# Patient Record
Sex: FEMALE | Race: WHITE | NOT HISPANIC OR LATINO | URBAN - METROPOLITAN AREA
[De-identification: names, ages, dates, MRNs, and addresses within clinical notes are randomized per-mention and may not be internally consistent; named-entity substitution may affect disease eponyms.]

---

## 2023-12-28 ENCOUNTER — OFFICE VISIT (OUTPATIENT)
Dept: URGENT CARE | Facility: CLINIC | Age: 53
End: 2023-12-28
Payer: COMMERCIAL

## 2023-12-28 VITALS
SYSTOLIC BLOOD PRESSURE: 111 MMHG | HEIGHT: 61 IN | HEART RATE: 73 BPM | RESPIRATION RATE: 18 BRPM | BODY MASS INDEX: 21.2 KG/M2 | OXYGEN SATURATION: 99 % | TEMPERATURE: 97 F | DIASTOLIC BLOOD PRESSURE: 71 MMHG | WEIGHT: 112.31 LBS

## 2023-12-28 DIAGNOSIS — J32.0 LEFT MAXILLARY SINUSITIS: Primary | ICD-10-CM

## 2023-12-28 PROCEDURE — 99204 PR OFFICE/OUTPT VISIT, NEW, LEVL IV, 45-59 MIN: ICD-10-PCS | Mod: S$GLB,,, | Performed by: NURSE PRACTITIONER

## 2023-12-28 PROCEDURE — 99204 OFFICE O/P NEW MOD 45 MIN: CPT | Mod: S$GLB,,, | Performed by: NURSE PRACTITIONER

## 2023-12-28 RX ORDER — CEFDINIR 300 MG/1
300 CAPSULE ORAL 2 TIMES DAILY
Qty: 14 CAPSULE | Refills: 0 | Status: SHIPPED | OUTPATIENT
Start: 2023-12-28 | End: 2024-01-04

## 2023-12-28 RX ORDER — PROMETHAZINE HYDROCHLORIDE AND DEXTROMETHORPHAN HYDROBROMIDE 6.25; 15 MG/5ML; MG/5ML
5 SYRUP ORAL EVERY 6 HOURS PRN
Qty: 180 ML | Refills: 0 | Status: SHIPPED | OUTPATIENT
Start: 2023-12-28

## 2023-12-28 RX ORDER — PREDNISONE 20 MG/1
40 TABLET ORAL DAILY
Qty: 10 TABLET | Refills: 0 | Status: SHIPPED | OUTPATIENT
Start: 2023-12-28

## 2023-12-28 RX ORDER — AZELASTINE 1 MG/ML
1 SPRAY, METERED NASAL 2 TIMES DAILY
Qty: 30 ML | Refills: 2 | Status: SHIPPED | OUTPATIENT
Start: 2023-12-28

## 2023-12-28 NOTE — PROGRESS NOTES
"Subjective:      Patient ID: Lindsey Jo is a 53 y.o. female.    Vitals:  height is 5' 1.02" (1.55 m) and weight is 51 kg (112 lb 5.2 oz). Her tympanic temperature is 97 °F (36.1 °C). Her blood pressure is 111/71 and her pulse is 73. Her respiration is 18 and oxygen saturation is 99%.     Chief Complaint: Sinus Problem    Patient presents with sore throat and left ear pain x 2 weeks. She states that she had sinus symptoms with no fever. Left side symptoms worse than Right.  She did not test for covid or flu.  Advil Cold and Sinus was taken with moderate relief.  Hx of sinus polyp removal     Sinus Problem  The current episode started 1 to 4 weeks ago (2). The problem has been gradually worsening since onset. There has been no fever. Associated symptoms include ear pain, headaches and a sore throat. Pertinent negatives include no congestion, coughing or sneezing. Treatments tried: Chloraseptic.       HENT:  Positive for ear pain and sore throat. Negative for congestion.    Respiratory:  Negative for cough.    Allergic/Immunologic: Negative for sneezing.   Neurological:  Positive for headaches.      Objective:     Vitals:    12/28/23 0927   BP: 111/71   BP Location: Right arm   Patient Position: Sitting   BP Method: Medium (Automatic)   Pulse: 73   Resp: 18   Temp: 97 °F (36.1 °C)   TempSrc: Tympanic   SpO2: 99%   Weight: 51 kg (112 lb 5.2 oz)   Height: 5' 1.02" (1.55 m)       Physical Exam   Constitutional: She is oriented to person, place, and time. She appears well-developed. She is cooperative.  Non-toxic appearance. She does not appear ill. No distress.   HENT:   Head: Normocephalic and atraumatic.   Ears:   Right Ear: Hearing, external ear and ear canal normal. A middle ear effusion is present.   Left Ear: Hearing, external ear and ear canal normal. Tympanic membrane is injected and retracted. A middle ear effusion is present.   Nose: Mucosal edema and congestion present. No rhinorrhea or nasal " deformity. No epistaxis. Right sinus exhibits frontal sinus tenderness. Right sinus exhibits no maxillary sinus tenderness. Left sinus exhibits frontal sinus tenderness. Left sinus exhibits no maxillary sinus tenderness.   Mouth/Throat: Uvula is midline and mucous membranes are normal. Mucous membranes are moist. No trismus in the jaw. Normal dentition. No uvula swelling. Posterior oropharyngeal erythema present. No oropharyngeal exudate or posterior oropharyngeal edema.   Eyes: Conjunctivae and lids are normal. Pupils are equal, round, and reactive to light. No scleral icterus. Extraocular movement intact   Neck: Trachea normal and phonation normal. Neck supple. No edema present. No erythema present. No neck rigidity present.   Cardiovascular: Normal rate, regular rhythm, normal heart sounds and normal pulses.   Pulmonary/Chest: Effort normal and breath sounds normal. No respiratory distress. She has no decreased breath sounds. She has no rhonchi.   Abdominal: Normal appearance.   Musculoskeletal: Normal range of motion.         General: No deformity. Normal range of motion.   Lymphadenopathy:     She has cervical adenopathy.   Neurological: She is alert and oriented to person, place, and time. She exhibits normal muscle tone. Coordination normal.   Skin: Skin is warm, dry, intact, not diaphoretic and not pale.   Psychiatric: Her speech is normal and behavior is normal. Judgment and thought content normal.   Nursing note and vitals reviewed.      Assessment:     1. Left maxillary sinusitis        Plan:   Patient stable for discharge and home management of condition      Left maxillary sinusitis  -     predniSONE (DELTASONE) 20 MG tablet; Take 2 tablets (40 mg total) by mouth once daily. Take with food  Dispense: 10 tablet; Refill: 0  -     cefdinir (OMNICEF) 300 MG capsule; Take 1 capsule (300 mg total) by mouth 2 (two) times daily. Take with food for 7 days  Dispense: 14 capsule; Refill: 0  -     azelastine  (ASTELIN) 137 mcg (0.1 %) nasal spray; 1 spray (137 mcg total) by Nasal route 2 (two) times daily.  Dispense: 30 mL; Refill: 2  -     promethazine-dextromethorphan (PROMETHAZINE-DM) 6.25-15 mg/5 mL Syrp; Take 5 mLs by mouth every 6 (six) hours as needed (cough/congestion).  Dispense: 180 mL; Refill: 0      Patient Instructions   Increase fluids   Rest activity ad alena   Tylenol 650 mg every 4-6 hrs as needed for fever headache body aches   Advil 200 mg 2-3 tabs every 6 hrs as needed for fever, headache body aches---Must take with food   Complete antibiotic as prescribed   Astelin  nasal spray  twice a day for allergic rhinitis    Supportive care measures   If symptoms persist or worsen follow up [facility] or PCP

## 2023-12-28 NOTE — PATIENT INSTRUCTIONS
Increase fluids   Rest activity ad alena   Tylenol 650 mg every 4-6 hrs as needed for fever headache body aches   Advil 200 mg 2-3 tabs every 6 hrs as needed for fever, headache body aches---Must take with food   Complete antibiotic as prescribed   Astelin  nasal spray  twice a day for allergic rhinitis    Supportive care measures   If symptoms persist or worsen follow up [facility] or PCP

## 2024-08-28 ENCOUNTER — OFFICE VISIT (OUTPATIENT)
Dept: URGENT CARE | Facility: CLINIC | Age: 54
End: 2024-08-28
Payer: COMMERCIAL

## 2024-08-28 VITALS
HEART RATE: 72 BPM | SYSTOLIC BLOOD PRESSURE: 86 MMHG | RESPIRATION RATE: 18 BRPM | DIASTOLIC BLOOD PRESSURE: 53 MMHG | TEMPERATURE: 99 F | HEIGHT: 60 IN | OXYGEN SATURATION: 98 % | BODY MASS INDEX: 22.48 KG/M2 | WEIGHT: 114.5 LBS

## 2024-08-28 DIAGNOSIS — M54.50 LOW BACK PAIN WITHOUT SCIATICA, UNSPECIFIED BACK PAIN LATERALITY, UNSPECIFIED CHRONICITY: Primary | ICD-10-CM

## 2024-08-28 PROCEDURE — 99214 OFFICE O/P EST MOD 30 MIN: CPT | Mod: S$GLB,,, | Performed by: FAMILY MEDICINE

## 2024-08-28 RX ORDER — METFORMIN HYDROCHLORIDE 500 MG/1
TABLET ORAL
COMMUNITY

## 2024-08-28 RX ORDER — PROGESTERONE 200 MG/1
200 CAPSULE ORAL NIGHTLY
COMMUNITY
Start: 2024-08-26

## 2024-08-28 RX ORDER — METHOCARBAMOL 500 MG/1
TABLET, FILM COATED ORAL
Qty: 30 TABLET | Refills: 0 | Status: SHIPPED | OUTPATIENT
Start: 2024-08-28

## 2024-08-28 NOTE — PATIENT INSTRUCTIONS
Rx robaxin to relax back muscle  You may still take tylenol or ibuprofen for pain  Keep your therapist appt  Follow up for further concerns

## 2024-08-28 NOTE — PROGRESS NOTES
Subjective:      Patient ID: Lindsey Jo is a 53 y.o. female.    Vitals:  height is 5' (1.524 m) and weight is 51.9 kg (114 lb 8.5 oz). Her tympanic temperature is 99.1 °F (37.3 °C). Her blood pressure is 86/53 (abnormal) and her pulse is 72. Her respiration is 18 and oxygen saturation is 98%.     Chief Complaint: Back Pain    Patient presents with lower back pain that started Saturday.  She noted that she hurt her back lifting a 12 pack of drinks while grocery shopping.  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  Pain is currently 7/10.  Advil taken with no relief.  Has chiropractor appt in 2 days    Back Pain  This is a new problem. The current episode started in the past 7 days (4). The problem has been gradually worsening since onset. The quality of the pain is described as aching. The pain radiates to the right thigh. The pain is at a severity of 7/10. The pain is Worse during the night. Pertinent negatives include no dysuria, tingling or weakness. She has tried NSAIDs for the symptoms. The treatment provided no relief.       Genitourinary:  Negative for dysuria.   Musculoskeletal:  Positive for back pain.      Objective:     Physical Exam   Constitutional: She does not appear ill. No distress.   Abdominal: Normal appearance.   Musculoskeletal: Normal range of motion.         General: Tenderness present. No swelling. Injury: right lower lumbar musculature. no tender over spinous process.Normal range of motion.   Neurological: She is alert.   Nursing note and vitals reviewed.      Assessment:     1. Low back pain without sciatica, unspecified back pain laterality, unspecified chronicity        Plan:       Low back pain without sciatica, unspecified back pain laterality, unspecified chronicity  -     methocarbamoL (ROBAXIN) 500 MG Tab; One pill at bed time, up to 3x a day. May cause drowsiness  Dispense: 30 tablet; Refill: 0      Discussed with the patient/parent the diagnosis, treatment  plan, expected follow-up with PCP, and red flag signs that are indications to escalate care.  All questions and concerns addressed.  Patient/guardian understands and is agreeable with the plan.

## 2024-09-18 ENCOUNTER — OFFICE VISIT (OUTPATIENT)
Dept: URGENT CARE | Facility: CLINIC | Age: 54
End: 2024-09-18
Payer: COMMERCIAL

## 2024-09-18 VITALS
BODY MASS INDEX: 22.03 KG/M2 | TEMPERATURE: 98 F | SYSTOLIC BLOOD PRESSURE: 104 MMHG | WEIGHT: 112.19 LBS | RESPIRATION RATE: 16 BRPM | HEART RATE: 78 BPM | OXYGEN SATURATION: 98 % | DIASTOLIC BLOOD PRESSURE: 69 MMHG | HEIGHT: 60 IN

## 2024-09-18 DIAGNOSIS — J06.9 UPPER RESPIRATORY TRACT INFECTION, UNSPECIFIED TYPE: ICD-10-CM

## 2024-09-18 DIAGNOSIS — J34.9 SINUS PROBLEM: Primary | ICD-10-CM

## 2024-09-18 LAB
CTP QC/QA: YES
SARS-COV-2 AG RESP QL IA.RAPID: NEGATIVE

## 2024-09-18 PROCEDURE — 87811 SARS-COV-2 COVID19 W/OPTIC: CPT | Mod: QW,S$GLB,, | Performed by: NURSE PRACTITIONER

## 2024-09-18 PROCEDURE — 99213 OFFICE O/P EST LOW 20 MIN: CPT | Mod: S$GLB,,, | Performed by: NURSE PRACTITIONER

## 2024-09-18 RX ORDER — MULTIVITAMIN
1 TABLET ORAL
COMMUNITY

## 2024-09-18 RX ORDER — TRETINOIN 0.25 MG/G
0.25 CREAM TOPICAL
COMMUNITY
Start: 2024-05-20

## 2024-09-18 RX ORDER — ASPIRIN 325 MG
1 TABLET, DELAYED RELEASE (ENTERIC COATED) ORAL
COMMUNITY
Start: 2024-07-15 | End: 2025-07-15

## 2024-09-18 RX ORDER — CETIRIZINE HYDROCHLORIDE 10 MG/1
10 TABLET ORAL
COMMUNITY

## 2024-09-18 RX ORDER — PREDNISONE 20 MG/1
20 TABLET ORAL 2 TIMES DAILY
Qty: 10 TABLET | Refills: 0 | Status: SHIPPED | OUTPATIENT
Start: 2024-09-18 | End: 2024-09-23

## 2024-09-18 NOTE — PROGRESS NOTES
Subjective:      Patient ID: Lindsey Jo is a 53 y.o. female.    Vitals:  height is 5' (1.524 m) and weight is 50.9 kg (112 lb 3.4 oz). Her oral temperature is 98.2 °F (36.8 °C). Her blood pressure is 104/69 and her pulse is 78. Her respiration is 16 and oxygen saturation is 98%.     Chief Complaint: Sinus Problem    Pt reports sinus congestion, post nasal drip with slight cough, sore throat, sinus pressure x 5 days. Denies known fever.     Sinus Problem  This is a new problem. The current episode started in the past 7 days. The problem has been gradually worsening since onset. There has been no fever. Her pain is at a severity of 0/10. Associated symptoms include congestion, coughing, ear pain, headaches, sinus pressure and a sore throat. Pertinent negatives include no chills, diaphoresis, hoarse voice, neck pain, shortness of breath, sneezing or swollen glands. Treatments tried: advil cold and sinus, sudafed. The treatment provided mild relief.       Constitution: Negative for chills, sweating and fever.   HENT:  Positive for ear pain, congestion, sinus pressure and sore throat.    Neck: Negative for neck pain.   Cardiovascular:  Negative for chest pain.   Eyes:  Negative for eye pain.   Respiratory:  Positive for cough. Negative for shortness of breath.    Gastrointestinal:  Negative for nausea and vomiting.   Skin:  Negative for rash.   Allergic/Immunologic: Negative for sneezing.   Neurological:  Positive for headaches.      Objective:     Physical Exam   Constitutional: She is oriented to person, place, and time. She appears well-developed.   HENT:   Head: Normocephalic and atraumatic.   Ears:   Right Ear: External ear normal.   Left Ear: External ear normal.   Nose: Nose normal.   Mouth/Throat: Mucous membranes are normal.   Eyes: Conjunctivae and lids are normal.   Neck: Trachea normal. Neck supple.   Cardiovascular: Normal rate, regular rhythm and normal heart sounds.   Pulmonary/Chest: Effort  normal and breath sounds normal. No respiratory distress.   Abdominal: Normal appearance and bowel sounds are normal. She exhibits no distension and no mass. Soft. There is no abdominal tenderness.   Musculoskeletal: Normal range of motion.         General: Normal range of motion.   Neurological: She is alert and oriented to person, place, and time. She has normal strength.   Skin: Skin is warm, dry, intact, not diaphoretic and not pale.   Psychiatric: Her speech is normal and behavior is normal. Judgment and thought content normal.   Nursing note and vitals reviewed.      Assessment:     1. Sinus problem    2. Upper respiratory tract infection, unspecified type        Plan:       Sinus problem  -     SARS Coronavirus 2 Antigen, POCT Manual Read    Upper respiratory tract infection, unspecified type  -     predniSONE (DELTASONE) 20 MG tablet; Take 1 tablet (20 mg total) by mouth 2 (two) times daily. for 5 days  Dispense: 10 tablet; Refill: 0      Patient counseled on symptomatic treatment.   - Rest  - Hydration-- 64 ounces fluid per day  - Cool mist humidifier/vaporizer  - Nasal saline/steroids  - Antihistamines  - Mucinex  - Gargles and lozenges for sore throat  - OTC pain/fever relievers    Follow up with PCP or ER immediately for worsening, new symptoms or no improvement. Go to ER if you develop fever of 103 or higher, chest pain, shortness of breath, upper back pain, stiff neck or severe headache.      Diagnosis, treatment, AVS reviewed with patient. Patient understands and agrees with plan

## 2024-10-03 ENCOUNTER — OFFICE VISIT (OUTPATIENT)
Dept: URGENT CARE | Facility: CLINIC | Age: 54
End: 2024-10-03
Payer: COMMERCIAL

## 2024-10-03 VITALS
SYSTOLIC BLOOD PRESSURE: 99 MMHG | DIASTOLIC BLOOD PRESSURE: 55 MMHG | OXYGEN SATURATION: 100 % | TEMPERATURE: 97 F | RESPIRATION RATE: 17 BRPM | HEIGHT: 60 IN | WEIGHT: 113 LBS | HEART RATE: 73 BPM | BODY MASS INDEX: 22.19 KG/M2

## 2024-10-03 DIAGNOSIS — H10.32 ACUTE BACTERIAL CONJUNCTIVITIS OF LEFT EYE: Primary | ICD-10-CM

## 2024-10-03 PROCEDURE — 99213 OFFICE O/P EST LOW 20 MIN: CPT | Mod: S$GLB,,, | Performed by: NURSE PRACTITIONER

## 2024-10-03 RX ORDER — TOBRAMYCIN 3 MG/ML
1 SOLUTION/ DROPS OPHTHALMIC EVERY 4 HOURS
Qty: 5 ML | Refills: 1 | Status: SHIPPED | OUTPATIENT
Start: 2024-10-03 | End: 2024-10-10

## 2024-10-03 RX ORDER — AZELASTINE HYDROCHLORIDE 0.5 MG/ML
1 SOLUTION/ DROPS OPHTHALMIC 2 TIMES DAILY PRN
Qty: 6 ML | Refills: 1 | Status: SHIPPED | OUTPATIENT
Start: 2024-10-03 | End: 2025-10-03

## 2024-10-03 RX ORDER — TOBRAMYCIN 3 MG/ML
1 SOLUTION/ DROPS OPHTHALMIC
Status: COMPLETED | OUTPATIENT
Start: 2024-10-03 | End: 2024-10-03

## 2024-10-03 RX ADMIN — TOBRAMYCIN 1 DROP: 3 SOLUTION/ DROPS OPHTHALMIC at 07:10

## 2024-10-03 NOTE — LETTER
October 3, 2024      [facility]  [address], [address]  [facility] [address]  Phone: [phone]  Fax: [phone]       Patient: Lindsey Jo   YOB: 1970  Date of Visit: 10/03/2024    To Whom It May Concern:    Karla Jo  was at [facility] on 10/03/2024. The patient may return to work/school on 10/07/2024 with no restrictions. If you have any questions or concerns, or if I can be of further assistance, please do not hesitate to contact me.    Sincerely,          Kathe Brewster, NP

## 2024-10-04 NOTE — PROGRESS NOTES
Subjective:      Patient ID: Lindsey Jo is a 53 y.o. female.    Vitals:  height is 5' (1.524 m) and weight is 51.2 kg (112 lb 15.8 oz). Her tympanic temperature is 97 °F (36.1 °C). Her blood pressure is 99/55 (abnormal) and her pulse is 73. Her respiration is 17 and oxygen saturation is 100%.     Chief Complaint: Eye Pain    Presents with left eye pain. States her eye is itchy and painful. Rates pain 4/10. Also sensitive to light. States her eye fee bruised.symptoms started on 10/03/2 especially after removing contacts     Eye Pain   The left eye is affected. This is a new problem. The current episode started yesterday. The problem occurs constantly. The problem has been unchanged. There was no injury mechanism. The pain is at a severity of 4/10. There is No known exposure to pink eye. She Wears contacts. Associated symptoms include an eye discharge, eye redness, itching and photophobia. She has tried nothing for the symptoms. The treatment provided no relief.       Eyes:  Positive for eye discharge, eye itching, eye pain, eye redness and photophobia.   Skin:  Negative for erythema.      Objective:     Vitals:    10/03/24 1859   BP: (!) 99/55   BP Location: Right arm   Patient Position: Sitting   Pulse: 73   Resp: 17   Temp: 97 °F (36.1 °C)   TempSrc: Tympanic   SpO2: 100%   Weight: 51.2 kg (112 lb 15.8 oz)   Height: 5' (1.524 m)       Physical Exam   Constitutional: She is oriented to person, place, and time. She appears well-developed.   HENT:   Head: Normocephalic and atraumatic. Head is without abrasion, without contusion and without laceration.   Ears:   Right Ear: External ear normal.   Left Ear: External ear normal.   Nose: Nose normal.   Mouth/Throat: Oropharynx is clear and moist and mucous membranes are normal. Mucous membranes are moist.   Eyes: EOM and lids are normal. Pupils are equal, round, and reactive to light. Lids are everted and swept, no foreign bodies found. Left eye exhibits no  discharge. No foreign body present in the left eye. Left conjunctiva is injected. vision grossly intact gaze aligned appropriately   Neck: Trachea normal and phonation normal. Neck supple.   Cardiovascular: Normal rate, regular rhythm and normal heart sounds.   Pulmonary/Chest: Effort normal and breath sounds normal. No stridor. No respiratory distress.   Musculoskeletal: Normal range of motion.         General: Normal range of motion.   Neurological: She is alert and oriented to person, place, and time.   Skin: Skin is warm, dry, intact and no rash. Capillary refill takes less than 2 seconds. No abrasion, No burn, No bruising, No erythema and No ecchymosis   Psychiatric: Her speech is normal and behavior is normal. Judgment and thought content normal.   Nursing note and vitals reviewed.        Assessment:     1. Acute bacterial conjunctivitis of left eye        Plan:     Patient stable for discharge and home management of condition    Acute bacterial conjunctivitis of left eye  -     tobramycin sulfate 0.3% ophthalmic solution 1 drop  -     tobramycin sulfate 0.3% (TOBREX) 0.3 % ophthalmic solution; Place 1 drop into the left eye every 4 (four) hours. While awake; wash hands before and after admin for 7 days  Dispense: 5 mL; Refill: 1  -     azelastine (OPTIVAR) 0.05 % ophthalmic solution; Place 1 drop into both eyes 2 (two) times daily as needed (itchiness).  Dispense: 6 mL; Refill: 1      Patient Instructions   Wash hands before and after instilling eye drops  Instill drops every 4 hrs while awake x 7 days   Wear dark shades/sunglasses until improved   Avoid rubbing eyes   No contact lens use   Change solutions and cases   Change linens as needed   Allergy meds as discussed and sent to pharmacy   Follow up with your PCP as needed or return to [facility]      Follow up with local eye profession in 2-3 days if no improvement        No follow-ups on file.

## 2024-10-04 NOTE — PATIENT INSTRUCTIONS
Wash hands before and after instilling eye drops  Instill drops every 4 hrs while awake x 7 days   Wear dark shades/sunglasses until improved   Avoid rubbing eyes   No contact lens use   Change solutions and cases   Change linens as needed   Allergy meds as discussed and sent to pharmacy   Follow up with your PCP as needed or return to [facility]      Follow up with local eye profession in 2-3 days if no improvement

## 2025-08-12 ENCOUNTER — OFFICE VISIT (OUTPATIENT)
Dept: URGENT CARE | Facility: CLINIC | Age: 55
End: 2025-08-12
Payer: COMMERCIAL

## 2025-08-12 VITALS
HEART RATE: 95 BPM | RESPIRATION RATE: 18 BRPM | DIASTOLIC BLOOD PRESSURE: 62 MMHG | OXYGEN SATURATION: 97 % | TEMPERATURE: 98 F | SYSTOLIC BLOOD PRESSURE: 91 MMHG

## 2025-08-12 DIAGNOSIS — J02.9 SORE THROAT: ICD-10-CM

## 2025-08-12 DIAGNOSIS — R09.81 NASAL CONGESTION: ICD-10-CM

## 2025-08-12 DIAGNOSIS — R05.9 COUGH, UNSPECIFIED TYPE: Primary | ICD-10-CM

## 2025-08-12 DIAGNOSIS — R50.9 FEVER, UNSPECIFIED FEVER CAUSE: ICD-10-CM

## 2025-08-12 DIAGNOSIS — R53.83 FATIGUE, UNSPECIFIED TYPE: ICD-10-CM

## 2025-08-12 DIAGNOSIS — U07.1 COVID-19 WITH MULTIPLE COMORBIDITIES: ICD-10-CM

## 2025-08-12 PROBLEM — J30.9 ALLERGIC RHINITIS: Status: ACTIVE | Noted: 2025-08-12

## 2025-08-12 PROBLEM — F41.9 ANXIETY: Status: ACTIVE | Noted: 2024-07-15

## 2025-08-12 LAB
CTP QC/QA: YES
SARS-COV+SARS-COV-2 AG RESP QL IA.RAPID: POSITIVE

## 2025-08-12 PROCEDURE — 99214 OFFICE O/P EST MOD 30 MIN: CPT | Mod: S$GLB,,, | Performed by: NURSE PRACTITIONER

## 2025-08-12 PROCEDURE — 87811 SARS-COV-2 COVID19 W/OPTIC: CPT | Mod: QW,S$GLB,, | Performed by: NURSE PRACTITIONER

## 2025-08-12 RX ORDER — BENZONATATE 200 MG/1
200 CAPSULE ORAL 3 TIMES DAILY PRN
Qty: 25 CAPSULE | Refills: 0 | Status: SHIPPED | OUTPATIENT
Start: 2025-08-12 | End: 2025-08-22

## 2025-08-12 RX ORDER — DEXTROMETHORPHAN HYDROBROMIDE, GUAIFENESIN AND PHENYLEPHRINE HYDROCHLORIDE 15; 400; 10 MG/1; MG/1; MG/1
1 TABLET ORAL EVERY 6 HOURS
Qty: 30 TABLET | Refills: 0 | Status: SHIPPED | OUTPATIENT
Start: 2025-08-12 | End: 2025-08-22

## 2025-08-12 RX ORDER — NIRMATRELVIR AND RITONAVIR 300-100 MG
KIT ORAL
Qty: 30 TABLET | Refills: 0 | Status: SHIPPED | OUTPATIENT
Start: 2025-08-12

## 2025-08-12 RX ORDER — ASPIRIN 325 MG
50000 TABLET, DELAYED RELEASE (ENTERIC COATED) ORAL
COMMUNITY
Start: 2025-06-09

## 2025-08-14 ENCOUNTER — TELEPHONE (OUTPATIENT)
Dept: URGENT CARE | Facility: CLINIC | Age: 55
End: 2025-08-14
Payer: COMMERCIAL